# Patient Record
Sex: FEMALE | Race: AMERICAN INDIAN OR ALASKA NATIVE
[De-identification: names, ages, dates, MRNs, and addresses within clinical notes are randomized per-mention and may not be internally consistent; named-entity substitution may affect disease eponyms.]

---

## 2020-10-22 NOTE — EMERGENCY DEPARTMENT REPORT
ED Rash HPI





- HPI


Chief Complaint: Skin Rash


Stated Complaint: RASH


Time Seen by Provider: 10/22/20 19:39


Rash Symptoms: Yes Itching, No Facial Swelling, No Tongue/Oral Swelling, No 

Breathing Difficulties, No Choking Sensation, No Wheezing/Dyspnea, No Peeling, 

No Blistering, No Fever, No Lightheaded, No Malaise, No Myalgias


Severity: moderate (Labial rash itching erythema after using nare hair remover 3

days ago )





ED Review of Systems


ROS: 


Stated complaint: RASH


Other details as noted in HPI





Constitutional: denies: chills, fever


Eyes: denies: eye pain, eye discharge, vision change


ENT: denies: ear pain, throat pain


Respiratory: denies: cough, shortness of breath, wheezing


Cardiovascular: denies: chest pain, palpitations


Endocrine: no symptoms reported


Gastrointestinal: denies: abdominal pain, nausea, diarrhea


Genitourinary: denies: urgency, dysuria, frequency, hematuria, discharge, 

abnormal menses


Musculoskeletal: denies: back pain, joint swelling, arthralgia


Skin: rash (labial )


Neurological: denies: headache, weakness, paresthesias


Psychiatric: denies: anxiety, depression


Hematological/Lymphatic: denies: easy bleeding, easy bruising





ED Past Medical Hx





- Past Medical History


Previous Medical History?: No





- Surgical History


Past Surgical History?: Yes


Additional Surgical History: left foot surgery





- Medications


Home Medications: 


                                Home Medications











 Medication  Instructions  Recorded  Confirmed  Last Taken  Type


 


metroNIDAZOLE [metroNIDAZOLE 0.75% 45 gm TP BID 7 Days #1 tube 10/22/20  Unknown

 Rx





gel TOPICAL]     














Rash Exam





- Exam


General: 


Vital signs noted. No distress. Alert and acting appropriately.





HEENT: No Periorbital Edema, No Conjuctival Injection, No Chemosis, No Perioral 

Edema, No Tongue Edema, No Uvular Edema, No Compromised Airway, No Drooling


Lungs: Yes Good Air Exchange (Normal Breath Sounds), No Wheezes, No Ronchi, No 

Stridor, No Cough, No Labored Respirations, No Retractions, No Use of Accessory 

Muscles, No Other Abnormal Lung Sounds


Heart: Yes Regular, No Murmur


Skin: Yes Urticarial Rash, Yes Maculopapular Rash, Yes Erythema, No Morbilliform

rash, No Bulla(e), No Excoriations, No Weeping, No Tenderness, No Edema, No 

Encrustations


Other: Positive: Abdomen Normal, Neurologic Normal, Musculoskeletal Normal (no 

vaginal discharge, no lesion , no weeping, mild erytheme rash maculs )





ED Course


                                   Vital Signs











  10/22/20 10/22/20





  16:02 16:03


 


Temperature 97.9 F 


 


Pulse Rate 72 


 


Respiratory 16 





Rate  


 


Blood Pressure 112/69 





[Left]  


 


Blood Pressure  115/38





[Right]  


 


O2 Sat by Pulse 100 





Oximetry  














ED Medical Decision Making





- Medical Decision Making


this is a contact vaginitis, plan diflucan, fungal cream follow up with pcp , pt

 verbalized agreement and understanding of discharge plan. 





Critical care attestation.: 


If time is entered above; I have spent that time in minutes in the direct care 

of this critically ill patient, excluding procedure time.








ED Disposition


Clinical Impression: 


Vaginitis


Qualifiers:


 Chronicity: acute Qualified Code(s): N76.0 - Acute vaginitis





Disposition: DC-01 TO HOME OR SELFCARE


Is pt being admited?: No


Does the pt Need Aspirin: No


Condition: Stable


Instructions:  Vaginitis (ED)


Prescriptions: 


metroNIDAZOLE [metroNIDAZOLE 0.75% gel TOPICAL] 45 gm TP BID 7 Days #1 tube


Forms:  Work/School Release Form(ED)


Time of Disposition: 20:23

## 2020-11-28 ENCOUNTER — HOSPITAL ENCOUNTER (EMERGENCY)
Dept: HOSPITAL 5 - ED | Age: 25
Discharge: HOME | End: 2020-11-28
Payer: COMMERCIAL

## 2020-11-28 VITALS — SYSTOLIC BLOOD PRESSURE: 115 MMHG | DIASTOLIC BLOOD PRESSURE: 70 MMHG

## 2020-11-28 DIAGNOSIS — R10.30: ICD-10-CM
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
## 2020-11-28 DIAGNOSIS — R10.2: ICD-10-CM

## 2020-11-28 DIAGNOSIS — Z3A.00: ICD-10-CM

## 2020-11-28 DIAGNOSIS — Z88.8: ICD-10-CM
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
## 2020-11-28 DIAGNOSIS — Z79.899: ICD-10-CM

## 2020-11-28 DIAGNOSIS — O26.891: Primary | ICD-10-CM

## 2020-11-28 DIAGNOSIS — Z98.890: ICD-10-CM

## 2020-11-28 LAB
ALBUMIN SERPL-MCNC: 4.1 G/DL (ref 3.9–5)
ALT SERPL-CCNC: 8 UNITS/L (ref 7–56)
BACTERIA #/AREA URNS HPF: (no result) /HPF
BASOPHILS # (AUTO): 0 K/MM3 (ref 0–0.1)
BASOPHILS NFR BLD AUTO: 0.5 % (ref 0–1.8)
BILIRUB UR QL STRIP: (no result)
BLOOD UR QL VISUAL: (no result)
BUN SERPL-MCNC: 9 MG/DL (ref 7–17)
BUN/CREAT SERPL: 18 %
CALCIUM SERPL-MCNC: 9.2 MG/DL (ref 8.4–10.2)
EOSINOPHIL # BLD AUTO: 0 K/MM3 (ref 0–0.4)
EOSINOPHIL NFR BLD AUTO: 0.7 % (ref 0–4.3)
HCT VFR BLD CALC: 33.4 % (ref 30.3–42.9)
HEMOLYSIS INDEX: 8
HGB BLD-MCNC: 11.3 GM/DL (ref 10.1–14.3)
LYMPHOCYTES # BLD AUTO: 2.2 K/MM3 (ref 1.2–5.4)
LYMPHOCYTES NFR BLD AUTO: 30.5 % (ref 13.4–35)
MCHC RBC AUTO-ENTMCNC: 34 % (ref 30–34)
MCV RBC AUTO: 88 FL (ref 79–97)
MONOCYTES # (AUTO): 0.5 K/MM3 (ref 0–0.8)
MONOCYTES % (AUTO): 7.4 % (ref 0–7.3)
MUCOUS THREADS #/AREA URNS HPF: (no result) /HPF
PH UR STRIP: 6 [PH] (ref 5–7)
PLATELET # BLD: 243 K/MM3 (ref 140–440)
RBC # BLD AUTO: 3.81 M/MM3 (ref 3.65–5.03)
RBC #/AREA URNS HPF: 1 /HPF (ref 0–6)
UROBILINOGEN UR-MCNC: < 2 MG/DL (ref ?–2)
WBC #/AREA URNS HPF: 3 /HPF (ref 0–6)

## 2020-11-28 PROCEDURE — 76817 TRANSVAGINAL US OBSTETRIC: CPT

## 2020-11-28 PROCEDURE — 84702 CHORIONIC GONADOTROPIN TEST: CPT

## 2020-11-28 PROCEDURE — 81001 URINALYSIS AUTO W/SCOPE: CPT

## 2020-11-28 PROCEDURE — 76801 OB US < 14 WKS SINGLE FETUS: CPT

## 2020-11-28 PROCEDURE — 85025 COMPLETE CBC W/AUTO DIFF WBC: CPT

## 2020-11-28 PROCEDURE — 80053 COMPREHEN METABOLIC PANEL: CPT

## 2020-11-28 PROCEDURE — 36415 COLL VENOUS BLD VENIPUNCTURE: CPT

## 2020-11-28 PROCEDURE — 81025 URINE PREGNANCY TEST: CPT
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
## 2020-11-28 NOTE — ULTRASOUND REPORT
ULTRASOUND OBSTETRIC 



REASON FOR EXAM: Lower abdominal pain, new pregnancy



TECHNIQUE: Transabdominal and transvaginal ultrasound was performed to evaluate a first trimester pre
gnancy.



COMPARISON: None available.



FINDINGS: 



PREGNANCY FINDINGS:

There is a single intrauterine gestational sac, without discrete yolk sac or fetal pole. No evidence 
of perigestational hemorrhage.



MATERNAL FINDINGS:

The uterus measures 8.4 x 5.1 x 5.3 cm. No myometrial mass.



The right ovary measures 3.1 x 1.8 x 2 cm and demonstrates a normal sonographic appearance.



The left ovary measures 5.2 x 4.3 x 3.8 cm and demonstrates a 3.4 cm left ovarian cyst, most compatib
le with corpus luteum. 



Cul-de-sac:  There is trace simple appearing free fluid.



IMPRESSION: 

Single intrauterine gestational sac without yolk sac or fetal pole identified. Findings may be due to
 early gestational age. Continued beta hCG and sonographic follow-up is recommended.



Left ovarian corpus luteum.



Signer Name: Moreno Elkins MD 

Signed: 11/28/2020 9:09 PM

Workstation Name: [email or web address]

## 2020-11-28 NOTE — ULTRASOUND REPORT
ULTRASOUND OBSTETRIC 



REASON FOR EXAM: Lower abdominal pain, new pregnancy



TECHNIQUE: Transabdominal and transvaginal ultrasound was performed to evaluate a first trimester pre
gnancy.



COMPARISON: None available.



FINDINGS: 



PREGNANCY FINDINGS:

There is a single intrauterine gestational sac, without discrete yolk sac or fetal pole. No evidence 
of perigestational hemorrhage.



MATERNAL FINDINGS:

The uterus measures 8.4 x 5.1 x 5.3 cm. No myometrial mass.



The right ovary measures 3.1 x 1.8 x 2 cm and demonstrates a normal sonographic appearance.



The left ovary measures 5.2 x 4.3 x 3.8 cm and demonstrates a 3.4 cm left ovarian cyst, most compatib
le with corpus luteum. 



Cul-de-sac:  There is trace simple appearing free fluid.



IMPRESSION: 

Single intrauterine gestational sac without yolk sac or fetal pole identified. Findings may be due to
 early gestational age. Continued beta hCG and sonographic follow-up is recommended.



Left ovarian corpus luteum.



Signer Name: Moreno Elkins MD 

Signed: 11/28/2020 9:09 PM

Workstation Name: [id number]